# Patient Record
Sex: FEMALE | Race: WHITE | NOT HISPANIC OR LATINO
[De-identification: names, ages, dates, MRNs, and addresses within clinical notes are randomized per-mention and may not be internally consistent; named-entity substitution may affect disease eponyms.]

---

## 2017-06-12 ENCOUNTER — RESULT REVIEW (OUTPATIENT)
Age: 57
End: 2017-06-12

## 2018-11-02 ENCOUNTER — OUTPATIENT (OUTPATIENT)
Dept: OUTPATIENT SERVICES | Facility: HOSPITAL | Age: 58
LOS: 1 days | End: 2018-11-02

## 2018-11-02 VITALS
SYSTOLIC BLOOD PRESSURE: 120 MMHG | HEIGHT: 60.5 IN | HEART RATE: 71 BPM | DIASTOLIC BLOOD PRESSURE: 72 MMHG | OXYGEN SATURATION: 99 % | TEMPERATURE: 99 F | RESPIRATION RATE: 16 BRPM | WEIGHT: 113.1 LBS

## 2018-11-02 DIAGNOSIS — L72.3 SEBACEOUS CYST: ICD-10-CM

## 2018-11-02 DIAGNOSIS — I49.9 CARDIAC ARRHYTHMIA, UNSPECIFIED: ICD-10-CM

## 2018-11-02 DIAGNOSIS — Z98.890 OTHER SPECIFIED POSTPROCEDURAL STATES: Chronic | ICD-10-CM

## 2018-11-02 NOTE — H&P PST ADULT - RS GEN PE MLT RESP DETAILS PC
no rales/clear to auscultation bilaterally/respirations non-labored/no wheezes/breath sounds equal/no rhonchi

## 2018-11-02 NOTE — H&P PST ADULT - HISTORY OF PRESENT ILLNESS
58 yr old female with medical hx of arrhythmia currently on metoprolol presents for preop evaluation with dx of Sebaceous Cyst.  Pt is now scheduled for Excision Left Scapular Sebaceous Cyst on 11/14/18.

## 2018-11-02 NOTE — H&P PST ADULT - PROBLEM SELECTOR PLAN 1
Scheduled for Excision of Left Scapula Sebaceous Cyst on 11/14/18.  Labs and ekg done by Dr. Banks will call for results.  Preop, famotidine and chlorhexidine instructions provided and questions addressed.

## 2018-11-02 NOTE — H&P PST ADULT - NSANTHOSAYNRD_GEN_A_CORE
No. KAE screening performed.  STOP BANG Legend: 0-2 = LOW Risk; 3-4 = INTERMEDIATE Risk; 5-8 = HIGH Risk

## 2018-11-02 NOTE — H&P PST ADULT - LYMPHATIC
posterior cervical R/supraclavicular R/posterior cervical L/supraclavicular L/anterior cervical L/anterior cervical R

## 2018-11-02 NOTE — H&P PST ADULT - PMH
Arrhythmia  17 yrs ago 1st episode of v-tach and another episode 2015 & 2016 lasting 10-30 sec  Lupus Arrhythmia  17 yrs ago 1st episode of v-tach and another episode 2015 & 2016 lasting 10-30 sec  Lupus    Sebaceous cyst  left scapula

## 2018-11-02 NOTE — H&P PST ADULT - GASTROINTESTINAL DETAILS
no distention/no masses palpable/bowel sounds normal/soft/no bruit/no guarding/no rigidity/no organomegaly/no rebound tenderness/nontender

## 2018-11-02 NOTE — H&P PST ADULT - FAMILY HISTORY
Father  Still living? Yes, Estimated age: 81-90  Family history of hypertension, Age at diagnosis: Age Unknown  Family history of type 2 diabetes mellitus, Age at diagnosis: Age Unknown

## 2018-11-02 NOTE — H&P PST ADULT - NEGATIVE OPHTHALMOLOGIC SYMPTOMS
no blurred vision R/no lacrimation L/no blurred vision L/no discharge L/no lacrimation R/no discharge R

## 2018-11-02 NOTE — H&P PST ADULT - NEGATIVE BREAST SYMPTOMS
no breast lump R/no nipple discharge L/no breast tenderness L/no breast lump L/no breast tenderness R/no nipple discharge R

## 2018-11-13 ENCOUNTER — TRANSCRIPTION ENCOUNTER (OUTPATIENT)
Age: 58
End: 2018-11-13

## 2018-11-14 ENCOUNTER — RESULT REVIEW (OUTPATIENT)
Age: 58
End: 2018-11-14

## 2018-11-14 ENCOUNTER — OUTPATIENT (OUTPATIENT)
Dept: OUTPATIENT SERVICES | Facility: HOSPITAL | Age: 58
LOS: 1 days | Discharge: HOME | End: 2018-11-14
Payer: COMMERCIAL

## 2018-11-14 VITALS — RESPIRATION RATE: 18 BRPM

## 2018-11-14 VITALS
RESPIRATION RATE: 18 BRPM | WEIGHT: 113.1 LBS | DIASTOLIC BLOOD PRESSURE: 59 MMHG | HEIGHT: 60.5 IN | TEMPERATURE: 98 F | SYSTOLIC BLOOD PRESSURE: 110 MMHG | OXYGEN SATURATION: 100 % | HEART RATE: 68 BPM

## 2018-11-14 DIAGNOSIS — Z98.890 OTHER SPECIFIED POSTPROCEDURAL STATES: Chronic | ICD-10-CM

## 2018-11-14 DIAGNOSIS — L72.3 SEBACEOUS CYST: ICD-10-CM

## 2018-11-14 PROCEDURE — 88304 TISSUE EXAM BY PATHOLOGIST: CPT | Mod: 26

## 2018-11-14 RX ORDER — ASPIRIN/CALCIUM CARB/MAGNESIUM 324 MG
1 TABLET ORAL
Qty: 0 | Refills: 0 | COMMUNITY

## 2018-11-14 RX ORDER — CHOLECALCIFEROL (VITAMIN D3) 125 MCG
1 CAPSULE ORAL
Qty: 0 | Refills: 0 | COMMUNITY

## 2018-11-14 RX ORDER — METOPROLOL TARTRATE 50 MG
1 TABLET ORAL
Qty: 0 | Refills: 0 | COMMUNITY

## 2018-11-14 RX ORDER — CALCIUM CARBONATE 500(1250)
1 TABLET ORAL
Qty: 0 | Refills: 0 | COMMUNITY

## 2018-11-14 RX ORDER — ASCORBIC ACID 60 MG
1 TABLET,CHEWABLE ORAL
Qty: 0 | Refills: 0 | COMMUNITY

## 2018-11-14 NOTE — ASU PREOP CHECKLIST - MEDICAL/PEDIATRIC CLEARANCE ON MEDICAL RECORD
EST                       (Author) in chart

## 2018-11-14 NOTE — BRIEF OPERATIVE NOTE - PROCEDURE
<<-----Click on this checkbox to enter Procedure Excision of sebaceous cyst  11/14/2018  left scapula  Active  LORRAINE

## 2018-11-14 NOTE — ASU DISCHARGE PLAN (ADULT/PEDIATRIC). - NOTIFY
Pain not relieved by Medications/Persistent Nausea and Vomiting/Fever greater than 101 Pain not relieved by Medications/Inability to Tolerate Liquids or Foods/Bleeding that does not stop/Swelling that continues/Persistent Nausea and Vomiting/Fever greater than 101/Numbness, color, or temperature change to extremity/Unable to Urinate

## 2018-11-14 NOTE — ASU DISCHARGE PLAN (ADULT/PEDIATRIC). - SPECIAL INSTRUCTIONS
- Leave steri strips in place  - Ice for swelling  - Tylenol or Ibuprofen for pain  - Follow up with Dr. Wang in the office in 7-10 days

## 2018-11-16 LAB — SURGICAL PATHOLOGY STUDY: SIGNIFICANT CHANGE UP

## 2020-09-01 ENCOUNTER — OUTPATIENT (OUTPATIENT)
Dept: OUTPATIENT SERVICES | Facility: HOSPITAL | Age: 60
LOS: 1 days | End: 2020-09-01

## 2020-09-01 DIAGNOSIS — Z98.890 OTHER SPECIFIED POSTPROCEDURAL STATES: Chronic | ICD-10-CM

## 2021-05-10 NOTE — ASU PREOP CHECKLIST - IDENTIFICATION BAND VERIFIED
done Nasal Turnover Hinge Flap Text: The defect edges were debeveled with a #15 scalpel blade.  Given the size, depth, location of the defect and the defect being full thickness a nasal turnover hinge flap was deemed most appropriate.  Using a sterile surgical marker, an appropriate hinge flap was drawn incorporating the defect. The area thus outlined was incised with a #15 scalpel blade. The flap was designed to recreate the nasal mucosal lining and the alar rim. The skin margins were undermined to an appropriate distance in all directions utilizing iris scissors.

## 2022-01-26 PROBLEM — L72.3 SEBACEOUS CYST: Chronic | Status: ACTIVE | Noted: 2018-11-02

## 2022-01-26 PROBLEM — I49.9 CARDIAC ARRHYTHMIA, UNSPECIFIED: Chronic | Status: ACTIVE | Noted: 2018-11-02

## 2023-06-08 NOTE — ASU DISCHARGE PLAN (ADULT/PEDIATRIC). - FOR NEXT 12 HOURS DO NOT:
Impression: Tear film insufficiency of bilateral lacrimal glands: H04.123. Plan: Mild dry eye both eyes - Recommend use of high quality artificial tears 3-4x per day. Statement Selected